# Patient Record
Sex: FEMALE | Race: BLACK OR AFRICAN AMERICAN | Employment: OTHER | URBAN - NONMETROPOLITAN AREA
[De-identification: names, ages, dates, MRNs, and addresses within clinical notes are randomized per-mention and may not be internally consistent; named-entity substitution may affect disease eponyms.]

---

## 2021-08-13 ENCOUNTER — HOSPITAL ENCOUNTER (EMERGENCY)
Age: 46
Discharge: HOME | End: 2021-08-13
Attending: FAMILY MEDICINE
Payer: MEDICARE

## 2021-08-13 VITALS
SYSTOLIC BLOOD PRESSURE: 122 MMHG | TEMPERATURE: 98.5 F | DIASTOLIC BLOOD PRESSURE: 92 MMHG | HEART RATE: 79 BPM | OXYGEN SATURATION: 99 % | RESPIRATION RATE: 18 BRPM

## 2021-08-13 DIAGNOSIS — N30.00 ACUTE CYSTITIS WITHOUT HEMATURIA: Primary | ICD-10-CM

## 2021-08-13 LAB
APPEARANCE UR: ABNORMAL
BACTERIA URNS QL MICRO: ABNORMAL /HPF
BILIRUB UR QL: ABNORMAL
COLOR UR: ABNORMAL
EPITH CASTS URNS QL MICRO: ABNORMAL /LPF (ref 0–20)
GLUCOSE UR STRIP.AUTO-MCNC: NEGATIVE MG/DL
HGB UR QL STRIP: NEGATIVE
KETONES UR QL STRIP.AUTO: 40 MG/DL
LEUKOCYTE ESTERASE UR QL STRIP.AUTO: ABNORMAL
MUCOUS THREADS URNS QL MICRO: ABNORMAL /LPF
NITRITE UR QL STRIP.AUTO: NEGATIVE
PH UR STRIP: 7 [PH] (ref 5–8)
PROT UR STRIP-MCNC: 100 MG/DL
RBC #/AREA URNS HPF: ABNORMAL /HPF (ref 0–2)
SP GR UR REFRACTOMETRY: 1.02 (ref 1–1.03)
UROBILINOGEN UR QL STRIP.AUTO: 1 EU/DL (ref 0.2–1)
WBC URNS QL MICRO: ABNORMAL /HPF (ref 0–4)

## 2021-08-13 PROCEDURE — 99283 EMERGENCY DEPT VISIT LOW MDM: CPT

## 2021-08-13 PROCEDURE — 81001 URINALYSIS AUTO W/SCOPE: CPT

## 2021-08-13 RX ORDER — AMLODIPINE BESYLATE 5 MG/1
5 TABLET ORAL DAILY
COMMUNITY

## 2021-08-13 RX ORDER — FLUOXETINE HYDROCHLORIDE 20 MG/1
CAPSULE ORAL DAILY
COMMUNITY

## 2021-08-13 RX ORDER — ACETAMINOPHEN 500 MG
TABLET ORAL DAILY
COMMUNITY

## 2021-08-13 RX ORDER — LORATADINE 10 MG/1
10 TABLET ORAL
COMMUNITY

## 2021-08-13 RX ORDER — CEPHALEXIN 500 MG/1
500 CAPSULE ORAL 3 TIMES DAILY
Qty: 21 CAPSULE | Refills: 0 | Status: SHIPPED | OUTPATIENT
Start: 2021-08-13 | End: 2021-08-20

## 2021-08-13 NOTE — ED NOTES
Pt's father has been to the desk 3 times to ask how much longer he has to wait, explained to him that ED was extremely busy and the doctor will be in as soon as the xray is read. Father asking if he can leave and come back later for results. Explained to father, that was not allowed. .. Sueellen Payment Sueellen Payment Sueellen Payment

## 2021-08-13 NOTE — ED PROVIDER NOTES
EMERGENCY DEPARTMENT HISTORY AND PHYSICAL EXAM      Date: 8/13/2021  Patient Name: Willie Diana    History of Presenting Illness     Chief Complaint   Patient presents with    Urinary Pain       History Provided By: Patient    HPI: Willie Diana, 39 y.o. female with a past medical history significant hypertension presents to the ED with cc of urinary symptoms. Patient's been having urinary pressure. She is having dysuria. She is having frequency. She is having hesitancy. She denies any fevers or chills. She denies flank pain. There are no other complaints, changes, or physical findings at this time. PCP: Alpesh Najera MD    No current facility-administered medications on file prior to encounter. Current Outpatient Medications on File Prior to Encounter   Medication Sig Dispense Refill    amLODIPine (NORVASC) 5 mg tablet Take 5 mg by mouth daily.  cholecalciferol (VITAMIN D3) (2,000 UNITS /50 MCG) cap capsule Take  by mouth daily.  FLUoxetine (PROzac) 20 mg capsule Take  by mouth daily.  loratadine (CLARITIN) 10 mg tablet Take 10 mg by mouth.  norethindrone-ethin. estradiol (NORTREL 7/7/7, 28, PO) Take  by mouth. Past History     Past Medical History:  Past Medical History:   Diagnosis Date    Depression     Hypertension        Past Surgical History:  Past Surgical History:   Procedure Laterality Date    HX LEEP PROCEDURE      HX ORTHOPAEDIC         Family History:  History reviewed. No pertinent family history. Social History:  Social History     Tobacco Use    Smoking status: Never Smoker    Smokeless tobacco: Never Used   Substance Use Topics    Alcohol use: Not Currently    Drug use: Never       Allergies:  No Known Allergies      Review of Systems     Review of Systems   Constitutional: Negative for fatigue and fever. HENT: Negative for rhinorrhea and sore throat. Respiratory: Negative for cough and shortness of breath.     Cardiovascular: Negative for chest pain and palpitations. Gastrointestinal: Negative for abdominal pain, diarrhea, nausea and vomiting. Genitourinary: Positive for decreased urine volume, dysuria, frequency and urgency. Negative for difficulty urinating, flank pain and hematuria. Musculoskeletal: Negative for arthralgias and myalgias. Skin: Negative for color change and rash. Neurological: Negative for light-headedness and headaches. Physical Exam     Physical Exam  Vitals and nursing note reviewed. Constitutional:       General: She is awake. She is not in acute distress. Appearance: Normal appearance. She is well-developed and normal weight. She is not ill-appearing, toxic-appearing or diaphoretic. Interventions: Face mask in place. HENT:      Head: Normocephalic and atraumatic. Eyes:      Conjunctiva/sclera: Conjunctivae normal.      Pupils: Pupils are equal, round, and reactive to light. Cardiovascular:      Rate and Rhythm: Normal rate and regular rhythm. Pulses: Normal pulses. Heart sounds: Normal heart sounds. Pulmonary:      Effort: Pulmonary effort is normal.      Breath sounds: Normal breath sounds. Abdominal:      General: Abdomen is flat. Palpations: Abdomen is soft. Tenderness: There is abdominal tenderness. Comments: Suprapubic tenderness     Musculoskeletal:      Cervical back: Normal range of motion and neck supple. Skin:     General: Skin is warm and dry. Neurological:      General: No focal deficit present. Mental Status: She is alert and oriented to person, place, and time. GCS: GCS eye subscore is 4. GCS verbal subscore is 5. GCS motor subscore is 6. Psychiatric:         Mood and Affect: Mood and affect normal.         Behavior: Behavior normal. Behavior is cooperative. Thought Content:  Thought content normal.         Lab and Diagnostic Study Results     Labs -     Recent Results (from the past 12 hour(s))   URINALYSIS W/ RFLX MICROSCOPIC    Collection Time: 08/13/21 11:35 AM   Result Value Ref Range    Color Dark Yellow      Appearance Cloudy      Specific gravity 1.024 1.005 - 1.030      pH (UA) 7.0 5.0 - 8.0      Protein 100 (A) Negative mg/dL    Glucose Negative Negative mg/dL    Ketone 40 (A) Negative mg/dL    Bilirubin Small (A) Negative      Blood Negative Negative      Urobilinogen 1.0 0.2 - 1.0 EU/dL    Nitrites Negative Negative      Leukocyte Esterase Small (A) Negative     URINE MICROSCOPIC    Collection Time: 08/13/21 11:35 AM   Result Value Ref Range    WBC 0-4 0 - 4 /hpf    RBC 0-5 0 - 2 /hpf    Epithelial cells Moderate 0 - 20 /lpf    Bacteria 2+ (A) None /hpf    Mucus 2+ /lpf       Radiologic Studies -   @lastxrresult@  CT Results  (Last 48 hours)    None        CXR Results  (Last 48 hours)    None            Medical Decision Making   - I am the first provider for this patient. - I reviewed the vital signs, available nursing notes, past medical history, past surgical history, family history and social history. - Initial assessment performed. The patients presenting problems have been discussed, and they are in agreement with the care plan formulated and outlined with them. I have encouraged them to ask questions as they arise throughout their visit. Vital Signs-Reviewed the patient's vital signs. Patient Vitals for the past 12 hrs:   Temp Pulse Resp BP SpO2   08/13/21 1030 98.5 °F (36.9 °C) 79 18 (!) 122/92 99 %       Records Reviewed: Nursing Notes        ED Course:          Provider Notes (Medical Decision Making): MDM       Procedures   Medical Decision Makingedical Decision Making  Performed by: Marcus Barrera MD  PROCEDURES:    Procedures       Disposition   Disposition:    Discharged    DISCHARGE PLAN:  1. Current Discharge Medication List      START taking these medications    Details   cephALEXin (Keflex) 500 mg capsule Take 1 Capsule by mouth three (3) times daily for 7 days.   Qty: 21 Capsule, Refills: 0         CONTINUE these medications which have NOT CHANGED    Details   amLODIPine (NORVASC) 5 mg tablet Take 5 mg by mouth daily. cholecalciferol (VITAMIN D3) (2,000 UNITS /50 MCG) cap capsule Take  by mouth daily. FLUoxetine (PROzac) 20 mg capsule Take  by mouth daily. loratadine (CLARITIN) 10 mg tablet Take 10 mg by mouth. norethindrone-ethin. estradiol (NORTREL 7/7/7, 28, PO) Take  by mouth. 2.   Follow-up Information     Follow up With Specialties Details Why Contact Info    Verito Alford MD Internal Medicine  As needed [address] [place] [address]  [phone]          3. Return to ED if worse   4. Current Discharge Medication List      START taking these medications    Details   cephALEXin (Keflex) 500 mg capsule Take 1 Capsule by mouth three (3) times daily for 7 days. Qty: 21 Capsule, Refills: 0  Start date: 8/13/2021, End date: 8/20/2021               Diagnosis     Clinical Impression:   1. Acute cystitis without hematuria        Attestations:    Jesus Hoskins MD    Please note that this dictation was completed with [id number], the [employer] voice recognition software. Quite often unanticipated grammatical, syntax, homophones, and other interpretive errors are inadvertently transcribed by the computer software. Please disregard these errors. Please excuse any errors that have escaped final proofreading. Thank you.

## 2021-11-05 ENCOUNTER — HOSPITAL ENCOUNTER (OUTPATIENT)
Dept: LAB | Age: 46
Discharge: HOME | End: 2021-11-05

## 2021-11-05 PROCEDURE — 99001 SPECIMEN HANDLING PT-LAB: CPT
